# Patient Record
Sex: MALE | Race: WHITE | NOT HISPANIC OR LATINO | Employment: FULL TIME | URBAN - METROPOLITAN AREA
[De-identification: names, ages, dates, MRNs, and addresses within clinical notes are randomized per-mention and may not be internally consistent; named-entity substitution may affect disease eponyms.]

---

## 2020-09-09 PROCEDURE — 99223 1ST HOSP IP/OBS HIGH 75: CPT | Performed by: SURGERY

## 2020-09-09 PROCEDURE — 44970 LAPAROSCOPY APPENDECTOMY: CPT | Performed by: SURGERY

## 2020-09-17 LAB — PATHOLOGIST NAME: NORMAL

## 2023-03-01 ENCOUNTER — HOSPITAL ENCOUNTER (OUTPATIENT)
Dept: GASTROENTEROLOGY | Age: 53
Discharge: HOME | End: 2023-03-01
Attending: INTERNAL MEDICINE

## 2023-03-01 VITALS
BODY MASS INDEX: 24.85 KG/M2 | HEIGHT: 72 IN | OXYGEN SATURATION: 98 % | HEART RATE: 66 BPM | SYSTOLIC BLOOD PRESSURE: 130 MMHG | WEIGHT: 183.5 LBS | RESPIRATION RATE: 18 BRPM | DIASTOLIC BLOOD PRESSURE: 73 MMHG | TEMPERATURE: 97 F

## 2023-03-01 DIAGNOSIS — Z12.11 SCREENING FOR COLON CANCER: ICD-10-CM

## 2023-03-01 PROCEDURE — 13000024 HB GI COMPLEX CASE S/U + 1ST 15 MIN

## 2023-03-01 PROCEDURE — 13000025 HB GI COMPLEX CASE EACH ADD MINUTE

## 2023-03-01 RX ORDER — SODIUM CHLORIDE, SODIUM LACTATE, POTASSIUM CHLORIDE, CALCIUM CHLORIDE 600; 310; 30; 20 MG/100ML; MG/100ML; MG/100ML; MG/100ML
INJECTION, SOLUTION INTRAVENOUS PRN
Status: DISCONTINUED | OUTPATIENT
Start: 2023-03-01 | End: 2023-03-03 | Stop reason: HOSPADM

## 2023-03-01 RX ORDER — 0.9 % SODIUM CHLORIDE 0.9 %
2 VIAL (ML) INJECTION EVERY 12 HOURS SCHEDULED
Status: DISCONTINUED | OUTPATIENT
Start: 2023-03-01 | End: 2023-03-03 | Stop reason: HOSPADM

## 2023-03-01 ASSESSMENT — PAIN SCALES - GENERAL
PAINLEVEL_OUTOF10: 0
PAINLEVEL_OUTOF10: 0

## 2024-03-27 ENCOUNTER — OFFICE VISIT (OUTPATIENT)
Dept: DERMATOLOGY | Facility: CLINIC | Age: 54
End: 2024-03-27
Payer: COMMERCIAL

## 2024-03-27 DIAGNOSIS — L28.1 PRURIGO NODULARIS: ICD-10-CM

## 2024-03-27 DIAGNOSIS — L20.9 ATOPIC DERMATITIS, UNSPECIFIED TYPE: Primary | ICD-10-CM

## 2024-03-27 PROCEDURE — 99204 OFFICE O/P NEW MOD 45 MIN: CPT | Performed by: PHYSICIAN ASSISTANT

## 2024-03-27 RX ORDER — TACROLIMUS 1 MG/G
OINTMENT TOPICAL
Qty: 30 G | Refills: 2 | Status: SHIPPED | OUTPATIENT
Start: 2024-03-27

## 2024-03-27 RX ORDER — HYDROXYZINE HYDROCHLORIDE 25 MG/1
TABLET, FILM COATED ORAL
Qty: 60 TABLET | Refills: 6 | Status: SHIPPED | OUTPATIENT
Start: 2024-03-27

## 2024-03-27 RX ORDER — CLOBETASOL PROPIONATE 0.5 MG/G
CREAM TOPICAL
Qty: 45 G | Refills: 4 | Status: SHIPPED | OUTPATIENT
Start: 2024-03-27

## 2024-03-27 NOTE — LETTER
3/27/2024         RE: Deon Rutledge  [address]  [facility] [address]        Dear Colleague,    Thank you for referring your patient, Deon Rutledge, to the [facility]\"". Please see a copy of my visit note below.    Deon Rutledge is an extremely pleasant 53 year old year old male patient here today for itchy rash. He notes that rash started this winter. He reported no new products and no one has similar rash. He reports that he is unsure if do to stress, patient  from spouse. He has tried triamcinolone, antihistamines and prednisone. He felt prednisone did help but flared shortly after finishing 5 day supply. He say allergy who did not feel this was allergy related.  He notes he is extremely itchy. Patient has no other skin complaints today.  Remainder of the HPI, Meds, PMH, Allergies, FH, and SH was reviewed in chart.    No past medical history on file.    No past surgical history on file.     No family history on file.    Social History     Socioeconomic History     Marital status:      Spouse name: Not on file     Number of children: Not on file     Years of education: Not on file     Highest education level: Not on file   Occupational History     Not on file   Tobacco Use     Smoking status: Never     Smokeless tobacco: Not on file   Substance and Sexual Activity     Alcohol use: Yes     Comment: occasional     Drug use: No     Sexual activity: Not on file   Other Topics Concern     Not on file   Social History Narrative     Not on file     Social Determinants of Health     Financial Resource Strain: Not on file   Food Insecurity: Not on file   Transportation Needs: Not on file   Physical Activity: Not on file   Stress: Not on file   Social Connections: Not on file   Interpersonal Safety: Not on file   Housing Stability: Not on file       Outpatient Encounter Medications as of 3/27/2024   Medication Sig Dispense Refill     clobetasol propionate (TEMOVATE)  0.05 % external cream Apply sparingly twice daily for two weeks then  use on Saturday and Sunday. 45 g 4     hydrOXYzine HCl (ATARAX) 25 MG tablet Take 1-2 tablets at bedtime for itch. 60 tablet 6     tacrolimus (PROTOPIC) 0.1 % external ointment Apply twice daily as needed to areas Monday through Friday to rash. 30 g 2     oxycodone-acetaminophen (PERCOCET) 5-325 MG per tablet Take 1 tablet by mouth every 6 hours as needed for pain. 20 tablet 0     No facility-administered encounter medications on file as of 3/27/2024.             O:   NAD, WDWN, Alert & Oriented, Mood & Affect wnl, Vitals stable   Here today alone   There were no vitals taken for this visit.   General appearance normal   Vitals stable   Alert, oriented and in no acute distress      Scabbed scaly excoriated papules on lower leg   LSC eczematous plaques on arms       Eyes: Conjunctivae/lids:Normal     ENT: Lips: normal    MSK:Normal    Pulm: Breathing Normal    Neuro/Psych: Orientation:Alert and Orientedx3 ; Mood/Affect:normal     A/P:  Prurigo and LSC with underlying adult onset atopic dermatitis  Recommend clobetasol twice daily for two week the reduce to twice weekly and alterate with tacrolimus ointment.   Take hydroxyzine 1-2 tabets at bedtime for itchy.   Start dupixent,  Skin care regimen reviewed with patient: Eliminate harsh soaps, i.e. Dial, zest, irsih spring; Mild soaps such as Cetaphil or Dove sensitive skin, avoid hot or cold showers, aggressive use of emollients including vanicream, cetaphil or cerave discussed with patient.    He is unable to do light therapy based on work schedule.   Return to clinic 4 months.     Again, thank you for allowing me to participate in the care of your patient.        Sincerely,        Rosy Garcia PA-C

## 2024-03-28 NOTE — PROGRESS NOTES
Deon Rutledge is an extremely pleasant 53 year old year old male patient here today for itchy rash. He notes that rash started this winter. He reported no new products and no one has similar rash. He reports that he is unsure if do to stress, patient  from spouse. He has tried triamcinolone, antihistamines and prednisone. He felt prednisone did help but flared shortly after finishing 5 day supply. He say allergy who did not feel this was allergy related.  He notes he is extremely itchy. Patient has no other skin complaints today.  Remainder of the HPI, Meds, PMH, Allergies, FH, and SH was reviewed in chart.    No past medical history on file.    No past surgical history on file.     No family history on file.    Social History     Socioeconomic History    Marital status:      Spouse name: Not on file    Number of children: Not on file    Years of education: Not on file    Highest education level: Not on file   Occupational History    Not on file   Tobacco Use    Smoking status: Never    Smokeless tobacco: Not on file   Substance and Sexual Activity    Alcohol use: Yes     Comment: occasional    Drug use: No    Sexual activity: Not on file   Other Topics Concern    Not on file   Social History Narrative    Not on file     Social Determinants of Health     Financial Resource Strain: Not on file   Food Insecurity: Not on file   Transportation Needs: Not on file   Physical Activity: Not on file   Stress: Not on file   Social Connections: Not on file   Interpersonal Safety: Not on file   Housing Stability: Not on file       Outpatient Encounter Medications as of 3/27/2024   Medication Sig Dispense Refill    clobetasol propionate (TEMOVATE) 0.05 % external cream Apply sparingly twice daily for two weeks then  use on Saturday and Sunday. 45 g 4    hydrOXYzine HCl (ATARAX) 25 MG tablet Take 1-2 tablets at bedtime for itch. 60 tablet 6    tacrolimus (PROTOPIC) 0.1 % external ointment Apply twice daily as  needed to areas Monday through Friday to rash. 30 g 2    oxycodone-acetaminophen (PERCOCET) 5-325 MG per tablet Take 1 tablet by mouth every 6 hours as needed for pain. 20 tablet 0     No facility-administered encounter medications on file as of 3/27/2024.             O:   NAD, WDWN, Alert & Oriented, Mood & Affect wnl, Vitals stable   Here today alone   There were no vitals taken for this visit.   General appearance normal   Vitals stable   Alert, oriented and in no acute distress      Scabbed scaly excoriated papules on lower leg   LSC eczematous plaques on arms       Eyes: Conjunctivae/lids:Normal     ENT: Lips: normal    MSK:Normal    Pulm: Breathing Normal    Neuro/Psych: Orientation:Alert and Orientedx3 ; Mood/Affect:normal     A/P:  Prurigo and LSC with underlying adult onset atopic dermatitis  Recommend clobetasol twice daily for two week the reduce to twice weekly and alterate with tacrolimus ointment.   Take hydroxyzine 1-2 tabets at bedtime for itchy.   Start dupixent,  Skin care regimen reviewed with patient: Eliminate harsh soaps, i.e. Dial, zest, irsih spring; Mild soaps such as Cetaphil or Dove sensitive skin, avoid hot or cold showers, aggressive use of emollients including vanicream, cetaphil or cerave discussed with patient.    He is unable to do light therapy based on work schedule.   Return to clinic 4 months.

## 2024-04-01 ENCOUNTER — TELEPHONE (OUTPATIENT)
Dept: DERMATOLOGY | Facility: CLINIC | Age: 54
End: 2024-04-01
Payer: COMMERCIAL

## 2024-04-01 NOTE — TELEPHONE ENCOUNTER
PA Initiation    Medication: DUPIXENT 300 MG/2ML SC SOPN  Insurance Company: [employer] ([facility]) - Phone [phone] Fax [phone]  Pharmacy Filling the Rx:    Filling Pharmacy Phone:    Filling Pharmacy Fax:    Start Date: 4/1/2024    [id number]         Nina Dumont [facility] [email or web address]  Phone: [phone]  Fax: [phone]

## 2024-04-03 NOTE — TELEPHONE ENCOUNTER
Prior Authorization Approval    Medication: DUPIXENT 300 MG/2ML SC SOPN  Authorization Effective Date: 4/1/2024  Authorization Expiration Date: 4/1/2025  Approved Dose/Quantity: 6ml per 28 days  Reference #: [id number]   Insurance Company: [employer] ([place]) - Phone [phone] Fax [phone]  Expected CoPay: $    CoPay Card Available:      Financial Assistance Needed: na  Which Pharmacy is filling the prescription:    Pharmacy Notified:   Patient Notified:         Nina Dumont [facility] [email or web address]  [email or web address]  Phone: [phone]  Fax: [phone]

## 2024-04-19 ENCOUNTER — PHARMACY VISIT (OUTPATIENT)
Dept: ADMINISTRATIVE | Facility: CLINIC | Age: 54
End: 2024-04-19
Payer: COMMERCIAL

## 2024-04-29 ENCOUNTER — MYC REFILL (OUTPATIENT)
Dept: DERMATOLOGY | Facility: CLINIC | Age: 54
End: 2024-04-29
Payer: COMMERCIAL

## 2024-04-29 DIAGNOSIS — L20.9 ATOPIC DERMATITIS, UNSPECIFIED TYPE: ICD-10-CM

## 2024-04-29 DIAGNOSIS — L28.1 PRURIGO NODULARIS: ICD-10-CM

## 2024-05-12 ENCOUNTER — HEALTH MAINTENANCE LETTER (OUTPATIENT)
Age: 54
End: 2024-05-12

## 2024-06-07 ENCOUNTER — PHARMACY VISIT (OUTPATIENT)
Dept: ADMINISTRATIVE | Facility: CLINIC | Age: 54
End: 2024-06-07
Payer: COMMERCIAL
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
## 2024-08-26 NOTE — PROGRESS NOTES
Atopic Dermatitis Clinical Follow Up Assessment    Spoke with Deon for assessment. Current Regimen: Dupixent 600mg once, then 300mg QOW thereafter.     Med/Allergy Changes: None reported     Tolerability: Denies SE/ISRs     Dosing Schedule: First dose: 4/10/24. Thinks he got about 1.5 pens' worth of Dupixent because the first one he injected he didn't hold in his skin long enough, so it squirted liquid out when he pulled it away. He then watched the Dupixent administration videos and was able to successfully give the second pen. Would not recommend he get a replacement dose since he feels he did get some of the dose injected in the first pen. Feels comfortable with injecting moving forward. Reviewed refill process with him.     Atopic Dermatitis: Noticing improvements already with reduced itchiness and skin starting to dry out more. He was surprised at the progress that's been made in such a short time. Reviewed time to efficacy and what to expect over the next few weeks with continued benefits.     Follow-Up: 1-2 months      Asha Menendez, PharmD  Therapy Management Pharmacist  [facility]  [facility]   None

## 2025-05-18 ENCOUNTER — HEALTH MAINTENANCE LETTER (OUTPATIENT)
Age: 55
End: 2025-05-18